# Patient Record
Sex: FEMALE | Race: ASIAN | NOT HISPANIC OR LATINO | URBAN - METROPOLITAN AREA
[De-identification: names, ages, dates, MRNs, and addresses within clinical notes are randomized per-mention and may not be internally consistent; named-entity substitution may affect disease eponyms.]

---

## 2020-06-24 ENCOUNTER — OUTPATIENT (OUTPATIENT)
Dept: OUTPATIENT SERVICES | Facility: HOSPITAL | Age: 70
LOS: 1 days | Discharge: HOME | End: 2020-06-24

## 2020-06-24 VITALS
RESPIRATION RATE: 17 BRPM | HEART RATE: 72 BPM | WEIGHT: 134.92 LBS | HEIGHT: 62 IN | DIASTOLIC BLOOD PRESSURE: 97 MMHG | TEMPERATURE: 98 F | SYSTOLIC BLOOD PRESSURE: 164 MMHG

## 2020-06-24 VITALS — HEART RATE: 75 BPM | DIASTOLIC BLOOD PRESSURE: 65 MMHG | SYSTOLIC BLOOD PRESSURE: 152 MMHG

## 2020-06-24 DIAGNOSIS — Z98.890 OTHER SPECIFIED POSTPROCEDURAL STATES: Chronic | ICD-10-CM

## 2020-07-01 DIAGNOSIS — H25.89 OTHER AGE-RELATED CATARACT: ICD-10-CM

## 2020-07-01 DIAGNOSIS — Z88.0 ALLERGY STATUS TO PENICILLIN: ICD-10-CM

## 2020-07-01 DIAGNOSIS — E78.00 PURE HYPERCHOLESTEROLEMIA, UNSPECIFIED: ICD-10-CM

## 2020-07-01 DIAGNOSIS — H52.201 UNSPECIFIED ASTIGMATISM, RIGHT EYE: ICD-10-CM

## 2020-07-28 PROBLEM — E78.00 PURE HYPERCHOLESTEROLEMIA, UNSPECIFIED: Chronic | Status: ACTIVE | Noted: 2020-06-24

## 2020-08-09 ENCOUNTER — OUTPATIENT (OUTPATIENT)
Dept: OUTPATIENT SERVICES | Facility: HOSPITAL | Age: 70
LOS: 1 days | Discharge: HOME | End: 2020-08-09

## 2020-08-09 DIAGNOSIS — Z11.59 ENCOUNTER FOR SCREENING FOR OTHER VIRAL DISEASES: ICD-10-CM

## 2020-08-09 DIAGNOSIS — Z98.890 OTHER SPECIFIED POSTPROCEDURAL STATES: Chronic | ICD-10-CM

## 2020-08-12 ENCOUNTER — OUTPATIENT (OUTPATIENT)
Dept: OUTPATIENT SERVICES | Facility: HOSPITAL | Age: 70
LOS: 1 days | Discharge: HOME | End: 2020-08-12

## 2020-08-12 VITALS — RESPIRATION RATE: 17 BRPM | DIASTOLIC BLOOD PRESSURE: 70 MMHG | HEART RATE: 77 BPM | SYSTOLIC BLOOD PRESSURE: 150 MMHG

## 2020-08-12 VITALS
TEMPERATURE: 97 F | RESPIRATION RATE: 18 BRPM | HEIGHT: 62 IN | WEIGHT: 134.92 LBS | OXYGEN SATURATION: 100 % | HEART RATE: 85 BPM | DIASTOLIC BLOOD PRESSURE: 83 MMHG | SYSTOLIC BLOOD PRESSURE: 171 MMHG

## 2020-08-12 DIAGNOSIS — Z88.1 ALLERGY STATUS TO OTHER ANTIBIOTIC AGENTS STATUS: ICD-10-CM

## 2020-08-12 DIAGNOSIS — Z98.890 OTHER SPECIFIED POSTPROCEDURAL STATES: Chronic | ICD-10-CM

## 2020-08-12 NOTE — PRE-ANESTHESIA EVALUATION ADULT - HEART RATE (BEATS/MIN)
85 Banner Transposition Flap Text: The defect edges were debeveled with a #15 scalpel blade.  Given the location of the defect and the proximity to free margins a Banner transposition flap was deemed most appropriate.  Using a sterile surgical marker, an appropriate flap drawn around the defect. The area thus outlined was incised deep to adipose tissue with a #15 scalpel blade.  The skin margins were undermined to an appropriate distance in all directions utilizing iris scissors.

## 2020-08-17 DIAGNOSIS — E78.00 PURE HYPERCHOLESTEROLEMIA, UNSPECIFIED: ICD-10-CM

## 2020-08-17 DIAGNOSIS — H26.9 UNSPECIFIED CATARACT: ICD-10-CM

## 2020-08-17 DIAGNOSIS — Z96.1 PRESENCE OF INTRAOCULAR LENS: ICD-10-CM

## 2020-08-17 DIAGNOSIS — H52.202 UNSPECIFIED ASTIGMATISM, LEFT EYE: ICD-10-CM

## 2020-08-17 DIAGNOSIS — Z98.41 CATARACT EXTRACTION STATUS, RIGHT EYE: ICD-10-CM

## 2020-08-17 DIAGNOSIS — Z88.1 ALLERGY STATUS TO OTHER ANTIBIOTIC AGENTS STATUS: ICD-10-CM

## 2020-08-17 DIAGNOSIS — Z20.828 CONTACT WITH AND (SUSPECTED) EXPOSURE TO OTHER VIRAL COMMUNICABLE DISEASES: ICD-10-CM

## 2021-01-06 PROBLEM — H26.9 UNSPECIFIED CATARACT: Chronic | Status: ACTIVE | Noted: 2020-08-12

## 2021-02-12 ENCOUNTER — APPOINTMENT (OUTPATIENT)
Dept: UROLOGY | Facility: CLINIC | Age: 71
End: 2021-02-12
Payer: COMMERCIAL

## 2021-02-12 VITALS
BODY MASS INDEX: 26.14 KG/M2 | WEIGHT: 142.06 LBS | HEART RATE: 100 BPM | DIASTOLIC BLOOD PRESSURE: 88 MMHG | HEIGHT: 62 IN | SYSTOLIC BLOOD PRESSURE: 189 MMHG

## 2021-02-12 DIAGNOSIS — Z78.9 OTHER SPECIFIED HEALTH STATUS: ICD-10-CM

## 2021-02-12 PROCEDURE — 99072 ADDL SUPL MATRL&STAF TM PHE: CPT

## 2021-02-12 PROCEDURE — 99204 OFFICE O/P NEW MOD 45 MIN: CPT

## 2021-02-16 LAB — URINE CYTOLOGY: NORMAL

## 2021-04-02 ENCOUNTER — APPOINTMENT (OUTPATIENT)
Dept: UROLOGY | Facility: CLINIC | Age: 71
End: 2021-04-02
Payer: COMMERCIAL

## 2021-04-02 VITALS — WEIGHT: 142 LBS | TEMPERATURE: 98 F | BODY MASS INDEX: 26.13 KG/M2 | HEIGHT: 62 IN

## 2021-04-02 DIAGNOSIS — R82.71 BACTERIURIA: ICD-10-CM

## 2021-04-02 DIAGNOSIS — N30.01 ACUTE CYSTITIS WITH HEMATURIA: ICD-10-CM

## 2021-04-02 PROCEDURE — 99214 OFFICE O/P EST MOD 30 MIN: CPT

## 2021-04-02 PROCEDURE — 99072 ADDL SUPL MATRL&STAF TM PHE: CPT

## 2021-04-02 NOTE — ASSESSMENT
[FreeTextEntry1] : 1. Gross hematuria  -- maybe related to bacteriuria.\par 2. ? Cystitis  --  Treated with ABT\par 3. Microhematuria \par 4. Uterine mass and Rt ovarian mass \par \par Plan\par GYN consult \par Repeat UCx\par Advised Cystoscopy, Pt and her  agreed under local Anesthesia.\par \par informed consent obtained, risks and benefits discussed including but not limited to infection, bleeding, sepsis, bladder perforation, post op retention, unforeseen complications such as, MI, CVA, DVT, PE\par \par

## 2021-04-02 NOTE — END OF VISIT
[FreeTextEntry3] : Patient notes was transcribed by kelsy Orosco     under the supervision of Dr. Guzman.\par I, Dr. Guzman, has reviewed the patient's chart and agree that it all lines with my medical decisions.\par

## 2021-04-02 NOTE — LETTER BODY
[Dear  ___] : Dear  [unfilled], [Consult Letter:] : I had the pleasure of evaluating your patient, [unfilled]. [( Thank you for referring [unfilled] for consultation for _____ )] : Thank you for referring [unfilled] for consultation for [unfilled] [Consult Closing:] : Thank you very much for allowing me to participate in the care of this patient.  If you have any questions, please do not hesitate to contact me. [FreeTextEntry1] : This is to summarize the consultation for Alina Duong seen February 12, 2021.\par \par Impression=  gross hematuria, of yet unknown etiology. History and physical exam seemed to suggest asymptomatic bacteriuria for which antibiotic therapy was started and completed. Patient denies voiding symptoms during the episode of hematuria. Urologic workup is clinically indicated considering the possibility for nephrolithiasis and bladder neoplasia.\par \par Plan=  urine cytology . CT urogram . Repeat urine culture . Consideration for cystoscopy

## 2021-04-02 NOTE — HISTORY OF PRESENT ILLNESS
[Hematuria - Gross] : gross hematuria [None] : None [FreeTextEntry1] : 71 year old female presents for f/up visit, sent by pcp for evaluation of episode of gross hematuria on Dec. 31. \par Initially when the Patient first presented at the office she reported waking up with spontaneous gross hematuria but with no dysuria or fever. PCP performed a UCx at that time and Rx'ed ABx Nitrofurantoin Mono-MCR taken for 6 days. Symptoms improved.\par \par I explained to the pt that she has to f/u GYN for the possible fibroid.\par \par CT scan from 2/21 : no evidence of upper and lower tract disease. 4.6 x4.5 cm low density lesion with the uterine body with no clear visualization of the endometrium. while this could represent a fibroid, it is incompletely evaluated. \par Urine Cytology 2/21:  Negative for Malignant Cells. \par 1/21 (from PCP) UCx < 10,000 colonies.\par 2/21 UA negative. [Urinary Urgency] : no urinary urgency [Urinary Frequency] : no urinary frequency [Straining] : no straining [Weak Stream] : no weak stream [Dysuria] : no dysuria [Abdominal Pain] : no abdominal pain [Flank Pain] : no flank pain [Fever] : no fever [Nausea] : no nausea [Anorexia] : no anorexia

## 2021-04-02 NOTE — PHYSICAL EXAM
[General Appearance - Well Developed] : well developed [General Appearance - Well Nourished] : well nourished [Normal Appearance] : normal appearance [Well Groomed] : well groomed [General Appearance - In No Acute Distress] : no acute distress [Abdomen Soft] : soft [Abdomen Tenderness] : non-tender [Costovertebral Angle Tenderness] : no ~M costovertebral angle tenderness [Skin Color & Pigmentation] : normal skin color and pigmentation [Edema] : no peripheral edema [] : no respiratory distress [Respiration, Rhythm And Depth] : normal respiratory rhythm and effort [Exaggerated Use Of Accessory Muscles For Inspiration] : no accessory muscle use [Oriented To Time, Place, And Person] : oriented to person, place, and time [Affect] : the affect was normal [Mood] : the mood was normal [Not Anxious] : not anxious [Normal Station and Gait] : the gait and station were normal for the patient's age [No Focal Deficits] : no focal deficits [No Palpable Adenopathy] : no palpable adenopathy [FreeTextEntry1] : Deferred to gyn.

## 2021-04-02 NOTE — REASON FOR VISIT
[Spouse] : spouse [Initial Visit ___] : [unfilled] is here today for an initial visit  for [unfilled] [Follow-up Visit ___] : a follow-up visit  for [unfilled]

## 2021-04-07 DIAGNOSIS — N39.0 URINARY TRACT INFECTION, SITE NOT SPECIFIED: ICD-10-CM

## 2021-05-13 ENCOUNTER — APPOINTMENT (OUTPATIENT)
Dept: UROLOGY | Facility: CLINIC | Age: 71
End: 2021-05-13
Payer: MEDICARE

## 2021-05-13 VITALS — TEMPERATURE: 97.7 F | HEIGHT: 62 IN | WEIGHT: 137 LBS | BODY MASS INDEX: 25.21 KG/M2

## 2021-05-13 DIAGNOSIS — R31.29 OTHER MICROSCOPIC HEMATURIA: ICD-10-CM

## 2021-05-13 DIAGNOSIS — R31.0 GROSS HEMATURIA: ICD-10-CM

## 2021-05-13 LAB
BILIRUB UR QL STRIP: NORMAL
CLARITY UR: CLEAR
COLLECTION METHOD: NORMAL
GLUCOSE UR-MCNC: NORMAL
HCG UR QL: 0.2 EU/DL
HGB UR QL STRIP.AUTO: NORMAL
KETONES UR-MCNC: NORMAL
LEUKOCYTE ESTERASE UR QL STRIP: NORMAL
NITRITE UR QL STRIP: NORMAL
PH UR STRIP: 5
PROT UR STRIP-MCNC: NORMAL
SP GR UR STRIP: 1

## 2021-05-13 PROCEDURE — 99072 ADDL SUPL MATRL&STAF TM PHE: CPT

## 2021-05-13 PROCEDURE — 81002 URINALYSIS NONAUTO W/O SCOPE: CPT

## 2021-05-13 PROCEDURE — 52000 CYSTOURETHROSCOPY: CPT

## 2021-06-03 ENCOUNTER — APPOINTMENT (OUTPATIENT)
Dept: OBGYN | Facility: CLINIC | Age: 71
End: 2021-06-03

## 2022-01-25 NOTE — ASU PREOP CHECKLIST - AICD PRESENT
Multiple chronic diagnoses but at a stable baseline  Continue medications      Follow-up with specialist   Revisit with me in November no

## 2022-03-02 ENCOUNTER — EMERGENCY (EMERGENCY)
Facility: HOSPITAL | Age: 72
LOS: 0 days | Discharge: HOME | End: 2022-03-02
Attending: EMERGENCY MEDICINE | Admitting: EMERGENCY MEDICINE
Payer: MEDICARE

## 2022-03-02 ENCOUNTER — TRANSCRIPTION ENCOUNTER (OUTPATIENT)
Age: 72
End: 2022-03-02

## 2022-03-02 VITALS
SYSTOLIC BLOOD PRESSURE: 160 MMHG | RESPIRATION RATE: 18 BRPM | HEIGHT: 62 IN | DIASTOLIC BLOOD PRESSURE: 96 MMHG | OXYGEN SATURATION: 97 % | WEIGHT: 134.92 LBS | TEMPERATURE: 97 F | HEART RATE: 96 BPM

## 2022-03-02 DIAGNOSIS — R00.2 PALPITATIONS: ICD-10-CM

## 2022-03-02 DIAGNOSIS — E78.00 PURE HYPERCHOLESTEROLEMIA, UNSPECIFIED: ICD-10-CM

## 2022-03-02 DIAGNOSIS — E83.52 HYPERCALCEMIA: ICD-10-CM

## 2022-03-02 DIAGNOSIS — Z98.890 OTHER SPECIFIED POSTPROCEDURAL STATES: Chronic | ICD-10-CM

## 2022-03-02 DIAGNOSIS — Z88.0 ALLERGY STATUS TO PENICILLIN: ICD-10-CM

## 2022-03-02 DIAGNOSIS — R07.9 CHEST PAIN, UNSPECIFIED: ICD-10-CM

## 2022-03-02 DIAGNOSIS — Z91.048 OTHER NONMEDICINAL SUBSTANCE ALLERGY STATUS: ICD-10-CM

## 2022-03-02 LAB
ALBUMIN SERPL ELPH-MCNC: 4.7 G/DL — SIGNIFICANT CHANGE UP (ref 3.5–5.2)
ALP SERPL-CCNC: 59 U/L — SIGNIFICANT CHANGE UP (ref 30–115)
ALT FLD-CCNC: 10 U/L — SIGNIFICANT CHANGE UP (ref 0–41)
ANION GAP SERPL CALC-SCNC: 11 MMOL/L — SIGNIFICANT CHANGE UP (ref 7–14)
AST SERPL-CCNC: 26 U/L — SIGNIFICANT CHANGE UP (ref 0–41)
BASOPHILS # BLD AUTO: 0.02 K/UL — SIGNIFICANT CHANGE UP (ref 0–0.2)
BASOPHILS NFR BLD AUTO: 0.4 % — SIGNIFICANT CHANGE UP (ref 0–1)
BILIRUB SERPL-MCNC: 0.2 MG/DL — SIGNIFICANT CHANGE UP (ref 0.2–1.2)
BUN SERPL-MCNC: 10 MG/DL — SIGNIFICANT CHANGE UP (ref 10–20)
CALCIUM SERPL-MCNC: 11.4 MG/DL — HIGH (ref 8.5–10.1)
CHLORIDE SERPL-SCNC: 103 MMOL/L — SIGNIFICANT CHANGE UP (ref 98–110)
CO2 SERPL-SCNC: 27 MMOL/L — SIGNIFICANT CHANGE UP (ref 17–32)
CREAT SERPL-MCNC: 0.6 MG/DL — LOW (ref 0.7–1.5)
EGFR: 96 ML/MIN/1.73M2 — SIGNIFICANT CHANGE UP
EOSINOPHIL # BLD AUTO: 0.03 K/UL — SIGNIFICANT CHANGE UP (ref 0–0.7)
EOSINOPHIL NFR BLD AUTO: 0.6 % — SIGNIFICANT CHANGE UP (ref 0–8)
GLUCOSE SERPL-MCNC: 110 MG/DL — HIGH (ref 70–99)
HCT VFR BLD CALC: 42.2 % — SIGNIFICANT CHANGE UP (ref 37–47)
HGB BLD-MCNC: 13.9 G/DL — SIGNIFICANT CHANGE UP (ref 12–16)
IMM GRANULOCYTES NFR BLD AUTO: 0.2 % — SIGNIFICANT CHANGE UP (ref 0.1–0.3)
LYMPHOCYTES # BLD AUTO: 1.23 K/UL — SIGNIFICANT CHANGE UP (ref 1.2–3.4)
LYMPHOCYTES # BLD AUTO: 25.4 % — SIGNIFICANT CHANGE UP (ref 20.5–51.1)
MAGNESIUM SERPL-MCNC: 2.4 MG/DL — SIGNIFICANT CHANGE UP (ref 1.8–2.4)
MCHC RBC-ENTMCNC: 30 PG — SIGNIFICANT CHANGE UP (ref 27–31)
MCHC RBC-ENTMCNC: 32.9 G/DL — SIGNIFICANT CHANGE UP (ref 32–37)
MCV RBC AUTO: 91.1 FL — SIGNIFICANT CHANGE UP (ref 81–99)
MONOCYTES # BLD AUTO: 0.33 K/UL — SIGNIFICANT CHANGE UP (ref 0.1–0.6)
MONOCYTES NFR BLD AUTO: 6.8 % — SIGNIFICANT CHANGE UP (ref 1.7–9.3)
NEUTROPHILS # BLD AUTO: 3.23 K/UL — SIGNIFICANT CHANGE UP (ref 1.4–6.5)
NEUTROPHILS NFR BLD AUTO: 66.6 % — SIGNIFICANT CHANGE UP (ref 42.2–75.2)
NRBC # BLD: 0 /100 WBCS — SIGNIFICANT CHANGE UP (ref 0–0)
PLATELET # BLD AUTO: 231 K/UL — SIGNIFICANT CHANGE UP (ref 130–400)
POTASSIUM SERPL-MCNC: 4.2 MMOL/L — SIGNIFICANT CHANGE UP (ref 3.5–5)
POTASSIUM SERPL-SCNC: 4.2 MMOL/L — SIGNIFICANT CHANGE UP (ref 3.5–5)
PROT SERPL-MCNC: 7.4 G/DL — SIGNIFICANT CHANGE UP (ref 6–8)
RBC # BLD: 4.63 M/UL — SIGNIFICANT CHANGE UP (ref 4.2–5.4)
RBC # FLD: 12.6 % — SIGNIFICANT CHANGE UP (ref 11.5–14.5)
SODIUM SERPL-SCNC: 141 MMOL/L — SIGNIFICANT CHANGE UP (ref 135–146)
TROPONIN T SERPL-MCNC: <0.01 NG/ML — SIGNIFICANT CHANGE UP
WBC # BLD: 4.85 K/UL — SIGNIFICANT CHANGE UP (ref 4.8–10.8)
WBC # FLD AUTO: 4.85 K/UL — SIGNIFICANT CHANGE UP (ref 4.8–10.8)

## 2022-03-02 PROCEDURE — 71045 X-RAY EXAM CHEST 1 VIEW: CPT | Mod: 26

## 2022-03-02 PROCEDURE — 93010 ELECTROCARDIOGRAM REPORT: CPT

## 2022-03-02 PROCEDURE — 99285 EMERGENCY DEPT VISIT HI MDM: CPT

## 2022-03-02 NOTE — ED PROVIDER NOTE - PROGRESS NOTE DETAILS
all results discussed with patient. patient is aware of ca . patient admits that she is on multi vitamin and then takes another ca tablet.   patient will follow up with her pmd this week.

## 2022-03-02 NOTE — ED PROVIDER NOTE - ATTENDING CONTRIBUTION TO CARE
71-year-old female presents with family member for evaluation of palpitations that started this morning while she was in the shower.  Episode lasted for about 15 minutes.  Patient states she had similar episode back in January and did not seek medical care at that time.  No chest pain, no shortness of breath, no dizziness, no nausea or vomiting. On exam pt in NAD AAO x 3, appears well, pleasant, speech is clear and fluent, face symmetrical, Lungs cta b/l, no wrr, no mur, no edema, abd soft nt nd,

## 2022-03-02 NOTE — ED PROVIDER NOTE - NSICDXPASTSURGICALHX_GEN_ALL_CORE_FT
PAST SURGICAL HISTORY:  History of surgery right ankle orif with screws, left breast cyst, ovary removed

## 2022-03-02 NOTE — ED PROVIDER NOTE - PATIENT PORTAL LINK FT
You can access the FollowMyHealth Patient Portal offered by [facility] by registering at the following website: [email or web address]. By joining [employer]’s FollowMyHealth portal, you will also be able to view your health information using other applications (apps) compatible with our system.

## 2022-03-02 NOTE — ED PROVIDER NOTE - NS ED ROS FT
Review of Systems:  	•	CONSTITUTIONAL - no fever, no diaphoresis, no chills  	•	SKIN - no rash  	•	HEMATOLOGIC - no bleeding, no bruising  	•	EYES - no eye pain, no blurry vision  	•	ENT - no change in hearing, no sore throat, no ear pain or tinnitus  	•	RESPIRATORY - no shortness of breath, no cough  	•	CARDIAC - no chest pain, no palpitations  	•	GI - no abd pain, no nausea, no vomiting, no diarrhea, no constipation  	•	GENITO-URINARY - no discharge, no dysuria; no hematuria, no increased urinary frequency  	•	MUSCULOSKELETAL - no joint paint, no swelling, no redness  	•	NEUROLOGIC -  weakness resolved , no headache, no paresthesias, no LOC

## 2022-03-02 NOTE — ED PROVIDER NOTE - CLINICAL SUMMARY MEDICAL DECISION MAKING FREE TEXT BOX
Labs and imaging obtained. Pt monitored on cardiac monitor. no arrythmia or ectopy noted, Results reviewed and discussed with pt.  Pt has been taking calcium supplements in addition to her MVI.  Pt instructed to hold her calcium supplement and follow up with PMD for repeat levels and further testing. Pt instructed to return if any worsening symptoms or concerns.  They verbalize understanding.

## 2022-03-02 NOTE — ED ADULT TRIAGE NOTE - CHIEF COMPLAINT QUOTE
pt states at 9:30am she felt palpitations that have resolved associated with persistent fatigue. Denies chest pain

## 2022-03-02 NOTE — ED PROVIDER NOTE - CARE PROVIDER_API CALL
Bud Watts)  Cardiovascular Disease; Internal Medicine  [address] [place] [address]  Phone: [phone]  Fax: [phone]  Follow Up Time:     Your doctor,   Phone: (   )    -  Fax: (   )    -  Follow Up Time:

## 2022-03-02 NOTE — ED PROVIDER NOTE - OBJECTIVE STATEMENT
this is 72 yo female presents to ed for evaluation of palpitations. patient states that she was in shower and started to feel symptoms. patient states symptoms lasted about 15 min. patient denies any chest pain. this is 72 yo female presents to ed for evaluation of palpitations. patient states that she was in shower and started to feel symptoms. patient states symptoms lasted about 15 min. patient denies any chest pain. patient states symptoms are resolved

## 2022-03-25 ENCOUNTER — NON-APPOINTMENT (OUTPATIENT)
Age: 72
End: 2022-03-25

## 2022-03-30 ENCOUNTER — APPOINTMENT (OUTPATIENT)
Dept: CARDIOLOGY | Facility: CLINIC | Age: 72
End: 2022-03-30
Payer: MEDICARE

## 2022-03-30 VITALS
WEIGHT: 139 LBS | SYSTOLIC BLOOD PRESSURE: 142 MMHG | DIASTOLIC BLOOD PRESSURE: 76 MMHG | BODY MASS INDEX: 25.58 KG/M2 | HEIGHT: 62 IN

## 2022-03-30 DIAGNOSIS — Z83.79 FAMILY HISTORY OF OTHER DISEASES OF THE DIGESTIVE SYSTEM: ICD-10-CM

## 2022-03-30 DIAGNOSIS — N30.31 TRIGONITIS WITH HEMATURIA: ICD-10-CM

## 2022-03-30 PROCEDURE — 93000 ELECTROCARDIOGRAM COMPLETE: CPT

## 2022-03-30 PROCEDURE — 99204 OFFICE O/P NEW MOD 45 MIN: CPT

## 2022-03-30 RX ORDER — MULTIVITAMIN
TABLET ORAL
Refills: 0 | Status: DISCONTINUED | COMMUNITY
End: 2022-03-30

## 2022-03-30 RX ORDER — SULFAMETHOXAZOLE AND TRIMETHOPRIM 800; 160 MG/1; MG/1
800-160 TABLET ORAL
Qty: 14 | Refills: 0 | Status: DISCONTINUED | COMMUNITY
Start: 2021-04-07 | End: 2022-03-30

## 2022-03-30 RX ORDER — NITROFURANTOIN (MONOHYDRATE/MACROCRYSTALS) 25; 75 MG/1; MG/1
100 CAPSULE ORAL
Qty: 4 | Refills: 3 | Status: DISCONTINUED | COMMUNITY
Start: 2021-05-13 | End: 2022-03-30

## 2022-03-30 NOTE — REVIEW OF SYSTEMS
[Fever] : no fever [Chills] : no chills [Blurry Vision] : no blurred vision [Hearing Loss] : hearing loss [SOB] : no shortness of breath [Dyspnea on exertion] : dyspnea during exertion [Cough] : no cough [Abdominal Pain] : no abdominal pain [Dysuria] : no dysuria [Joint Pain] : no joint pain [Dizziness] : no dizziness [Confusion] : no confusion was observed [Easy Bleeding] : no tendency for easy bleeding [de-identified] : edmund

## 2022-03-30 NOTE — HISTORY OF PRESENT ILLNESS
[FreeTextEntry1] : 71 yo with this month several episodes palpitations up to 10 minutes . No associated chest pain. She felt light headed. She has SANCHEZ. She drinks decaf coffee. She does stretching. She snores. She dreams.

## 2022-03-30 NOTE — PHYSICAL EXAM
[Well Developed] : well developed [Well Nourished] : well nourished [Normal Conjunctiva] : normal conjunctiva [Normal Venous Pressure] : normal venous pressure [5th Left ICS - MCL] : palpated at the 5th LICS in the midclavicular line [Normal Rate] : normal [Rhythm Regular] : regular [Normal S1] : normal S1 [Normal S2] : normal S2 [S3] : no S3 [S4] : no S4 [I] : a grade 1 [Clear Lung Fields] : clear lung fields [Soft] : abdomen soft [Normal Gait] : normal gait [No Edema] : no edema [No Rash] : no rash [Moves all extremities] : moves all extremities [Alert and Oriented] : alert and oriented

## 2022-03-30 NOTE — DISCUSSION/SUMMARY
[FreeTextEntry1] : 71 yo with this month several episodes palpitations up to 10 minutes . No associated chest pain. She felt light headed. She has SANCHEZ. She drinks decaf coffee. She does stretching. She snores. She dreams. \par Her EKG is abnormal . Possible septal MI. She has SANCHEZ . She needs stress echo.  She also needs a MCOT. All explained to patient and  . Questions answered

## 2022-04-05 ENCOUNTER — OUTPATIENT (OUTPATIENT)
Dept: OUTPATIENT SERVICES | Facility: HOSPITAL | Age: 72
LOS: 1 days | Discharge: HOME | End: 2022-04-05
Payer: MEDICARE

## 2022-04-05 DIAGNOSIS — R00.2 PALPITATIONS: ICD-10-CM

## 2022-04-05 DIAGNOSIS — Z98.890 OTHER SPECIFIED POSTPROCEDURAL STATES: Chronic | ICD-10-CM

## 2022-04-05 PROCEDURE — 93018 CV STRESS TEST I&R ONLY: CPT

## 2022-04-05 PROCEDURE — 93351 STRESS TTE COMPLETE: CPT | Mod: 26

## 2022-04-05 PROCEDURE — 93016 CV STRESS TEST SUPVJ ONLY: CPT

## 2022-04-29 ENCOUNTER — APPOINTMENT (OUTPATIENT)
Dept: CARDIOLOGY | Facility: CLINIC | Age: 72
End: 2022-04-29

## 2022-04-29 PROCEDURE — 93228 REMOTE 30 DAY ECG REV/REPORT: CPT

## 2022-06-06 ENCOUNTER — APPOINTMENT (OUTPATIENT)
Dept: CARDIOLOGY | Facility: CLINIC | Age: 72
End: 2022-06-06
Payer: MEDICARE

## 2022-06-06 VITALS
BODY MASS INDEX: 24.66 KG/M2 | HEIGHT: 62 IN | WEIGHT: 134 LBS | SYSTOLIC BLOOD PRESSURE: 136 MMHG | DIASTOLIC BLOOD PRESSURE: 70 MMHG

## 2022-06-06 PROCEDURE — 99213 OFFICE O/P EST LOW 20 MIN: CPT

## 2022-06-06 NOTE — DISCUSSION/SUMMARY
[FreeTextEntry1] : 73 yo with this month several episodes palpitations up to 10 minutes . No associated chest pain. She felt light headed. She has ARAMBULA. She drinks decaf coffee. She does stretching. She snores. She dreams. \par  Palpitations now  resolved MCOT 4/22 neg. SE 4/22 EKG equivocal echo neg. She lost 5 llbs. Now treadmill 20 mins 6 per week.No chest pain. No Arambula.. told exrcise 30 minutes.  questions answered

## 2022-06-06 NOTE — HISTORY OF PRESENT ILLNESS
[FreeTextEntry1] : 73 yo with this month several episodes palpitations up to 10 minutes . Occurred 3/22. Now resolved MCOT 4/22 neg SE 4/22 EKG equivocal echo neg. She lost 5 llbs. Now treadmill 20 mins 6 per week.No chest pain. No Arambula.

## 2022-06-06 NOTE — REVIEW OF SYSTEMS
[Fever] : no fever [Chills] : no chills [Blurry Vision] : no blurred vision [Hearing Loss] : hearing loss [SOB] : no shortness of breath [Dyspnea on exertion] : dyspnea during exertion [Cough] : no cough [Abdominal Pain] : no abdominal pain [Dysuria] : no dysuria [Joint Pain] : no joint pain [Dizziness] : no dizziness [Confusion] : no confusion was observed [Easy Bleeding] : no tendency for easy bleeding [de-identified] : edmund

## 2022-12-05 ENCOUNTER — APPOINTMENT (OUTPATIENT)
Dept: CARDIOLOGY | Facility: CLINIC | Age: 72
End: 2022-12-05

## 2022-12-05 VITALS
SYSTOLIC BLOOD PRESSURE: 126 MMHG | WEIGHT: 130 LBS | BODY MASS INDEX: 23.92 KG/M2 | HEIGHT: 62 IN | DIASTOLIC BLOOD PRESSURE: 72 MMHG

## 2022-12-05 PROCEDURE — 99213 OFFICE O/P EST LOW 20 MIN: CPT

## 2022-12-05 NOTE — DISCUSSION/SUMMARY
[FreeTextEntry1] : 71 yo with this month several episodes palpitations up to 10 minutes in past . No associated chest pain. She felt light headed. No further ARAMBULA.  She drinks decaf coffee. She does stretching. She snores. She dreams. \par  Palpitations now  resolved MCOT 4/22 neg. SE 4/22 EKG equivocal echo neg. She lost 4  llbs. Now treadmill 30 mins 5  per week.No chest pain. No Arambula.

## 2022-12-05 NOTE — HISTORY OF PRESENT ILLNESS
[FreeTextEntry1] : 73 yo Palpitations in past. Felt was going have palpitations but did not.   Now resolved MCOT 4/22 neg SE 4/22 EKG equivocal echo neg. She lost 4 llbs. Now treadmill 30 mins 5 per week.No chest pain. No Arambula.

## 2022-12-05 NOTE — REVIEW OF SYSTEMS
[Fever] : no fever [Chills] : no chills [Blurry Vision] : no blurred vision [Hearing Loss] : hearing loss [SOB] : no shortness of breath [Dyspnea on exertion] : dyspnea during exertion [Cough] : no cough [Abdominal Pain] : no abdominal pain [Dysuria] : no dysuria [Joint Pain] : no joint pain [Dizziness] : no dizziness [Confusion] : no confusion was observed [Easy Bleeding] : no tendency for easy bleeding [de-identified] : edmund

## 2023-07-24 ENCOUNTER — APPOINTMENT (OUTPATIENT)
Dept: PAIN MANAGEMENT | Facility: CLINIC | Age: 73
End: 2023-07-24
Payer: MEDICARE

## 2023-07-24 DIAGNOSIS — M54.12 RADICULOPATHY, CERVICAL REGION: ICD-10-CM

## 2023-07-24 PROCEDURE — 99204 OFFICE O/P NEW MOD 45 MIN: CPT

## 2023-07-24 RX ORDER — MELOXICAM 7.5 MG/1
7.5 TABLET ORAL
Qty: 30 | Refills: 0 | Status: ACTIVE | COMMUNITY
Start: 2023-07-24 | End: 1900-01-01

## 2023-07-26 NOTE — DISCUSSION/SUMMARY
[de-identified] : Recommendations\par 1. Physical therapy\par \par 2. meloxicam 15mg qD PRN\par I advised MULU that the NSAID should be taken with food.  In addition while taking the prescribed NSAID, no over the counter or other NSAIDs should be used, such as ibuprofen (Motrin or Advil) or naproxen (Aleve) as this can cause stomach upset or other side effects.  If needed for fever or breakthrough pain Tylenol can be used. \par \par f/u in 6 weeks

## 2023-07-26 NOTE — HISTORY OF PRESENT ILLNESS
[FreeTextEntry1] : Patient presents with complaints of neck pain that refers to the shoulders and upper back. The patient presents with complaints of neck pain. Pain is located in the midline and refers down the R upper extremities. The pain is aching and throbbing with intermittent sharp stabbing sensations down the r arm. The pain is present the majority of the day and made worse with activity.  The pain limits the patient's overall functional status and quality of life. The pain is associated with subjective weakness and occasional numbness/tingling in the upper extremity and is not responding to activity modifications, rest, or nsaid use. Pain is 8/10 on the pain scale.

## 2023-07-26 NOTE — PHYSICAL EXAM
[de-identified] : Cervical Spine Exam:\par \par Inspection:\par erythema (-) \par ecchymosis (-) \par rashes (-) \par \par Palpation:                                        \par Cervical paraspinal mm tenderness:   R (+); L(-)\par Upper trapezius mm tenderness:        R (+); L (-)\par Rhomboids tenderness:                       R (-); L (-)\par Scalenes mm tenderness:                   R (-); L (-)\par Occipital Ridge:                                    R (-); L (-)\par Supraspinatus mm tenderness:           R (-); L (-)\par \par ROM:  \par limited rom 2/2 to pain with neck flexion and neck extension\par \par Strength Testing:            Right    Left\par Deltoid                             (5/5)    (5/5)\par Biceps:                            (5/5)    (5/5)\par Triceps:                           (5/5)    (5/5)\par Finger Abductors:           (5/5)    (5/5)\par Grasp:                             (5/5)    (5/5)\par \par Special Testing:\par Spurling Test:                  R (+); L (-)\par Facet load test:               R (-); L (-)

## 2023-09-05 ENCOUNTER — APPOINTMENT (OUTPATIENT)
Dept: PAIN MANAGEMENT | Facility: CLINIC | Age: 73
End: 2023-09-05
Payer: MEDICARE

## 2023-09-05 VITALS — WEIGHT: 130 LBS | BODY MASS INDEX: 23.92 KG/M2 | HEIGHT: 62 IN

## 2023-09-05 PROCEDURE — 99214 OFFICE O/P EST MOD 30 MIN: CPT

## 2023-09-05 NOTE — PHYSICAL EXAM
[de-identified] : Cervical Spine Exam:  Inspection: erythema (-)  ecchymosis (-)  rashes (-)   Palpation:                                         Cervical paraspinal mm tenderness:   R (+); L(-) Upper trapezius mm tenderness:        R (+); L (-) Rhomboids tenderness:                       R (-); L (-) Scalenes mm tenderness:                   R (-); L (-) Occipital Ridge:                                    R (-); L (-) Supraspinatus mm tenderness:           R (-); L (-)  ROM:   limited rom 2/2 to pain with neck flexion   Strength Testing:            Right    Left Deltoid                             (4+/5)    (5/5) Biceps:                            (5/5)    (5/5) Triceps:                           (5/5)    (5/5) Finger Abductors:           (5/5)    (5/5) Grasp:                             (5/5)    (5/5)  Special Testing: Spurling Test:                  R (+); L (-) Facet load test:               R (-); L (-)

## 2023-09-05 NOTE — DISCUSSION/SUMMARY
[de-identified] : Recommendations 1. continue with home exercises for the cervical spine   2. c/w meloxicam 15mg qD PRN I advised Duong that the NSAID should be taken with food.  In addition while taking the prescribed NSAID, no over the counter or other NSAIDs should be used, such as ibuprofen (Motrin or Advil) or naproxen (Aleve) as this can cause stomach upset or other side effects.  If needed for fever or breakthrough pain Tylenol can be used.   3. Will proceed with C7-T1 ATIYA with mac  Treatment options were discussed. The patient has been having persistent, severe neck pain and cervical radicular pain that has minimally improved with conservative therapy thus far (including physical therapy/chiropractic manipulations/home stretching and anti-inflammatory medications for 8 weeks. The patient was given the option of proceeding with a cervical epidural steroid injection to try to get the patient some pain relief.   The risks and benefits were discussed, which included the associated problems with steroids, bleeding, nerve injury, lack of improvement with pain, and 0.5% chance of a post dural puncture headache.   follow up two weeks after injection   I Paula Landry attest that this documentation has been prepared under the direction and in the presence of provider Dr. Jeff Wooten.  The documentation recorded by the scribe in my presence, accurately reflects the service I personally performed, and the decisions made by me with my edits as appropriate.   Jeff Wooten, DO

## 2023-09-05 NOTE — HISTORY OF PRESENT ILLNESS
[FreeTextEntry1] : Patient presents with complaints of neck pain that refers to the shoulders and upper back. The patient presents with complaints of neck pain. Pain is located in the midline and refers down the R upper extremities. The pain is aching and throbbing with intermittent sharp stabbing sensations down the r arm. The pain is present the majority of the day and made worse with activity.  The pain limits the patient's overall functional status and quality of life. The pain is associated with subjective weakness and occasional numbness/tingling in the upper extremity and is not responding to activity modifications, rest, or nsaid use. Pain is 8/10 on the pain scale.   9/5/23: Today this patient is here for a follow up visit for her right sided neck pain. The patient reports of 50% of improvement since we seen her last. This patent has utilized meloxicam for ten days with relief. The patient completed sessions of PT for the cervical spine and states her last session was last week. The patient pain has been going on for 3 months that is worse upon waking up with pain that is associated with stiffness. When turning head that patient feels a pinch like sensation down right side of cervical trap. This pain is disrupting her sleep at night with certain movements. The pain level today is 5/10 on the pain scale.   Patient denies any bowel or bladder dysfunction, incontinence, or saddle anesthesia.

## 2023-09-05 NOTE — DATA REVIEWED
[FreeTextEntry1] : reviewed in detail MRI of the cervical spine that was done on 7/5/23: broad-based central disc protrusions and moderate BL neural foraminal narrowing at C2-3 and C4-5 with mild flattening of the ventral cord at C4-5, unchanged. Broad-based disc/osteophyte complexes at the C3-4, C5-6 and C6-7 levels with mild flattening of the ventral cord, unchanged. Moderate BL neural foraminal narrowing is also reidentified at C3-4 and C5-6 with marked BL neural foraminal narrowing at C6-7.  Left paracentral/subarticular disc protrusion now noted at C7-T1 which mildly flattens the ventral cord on the left.  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

## 2023-09-20 ENCOUNTER — APPOINTMENT (OUTPATIENT)
Dept: PAIN MANAGEMENT | Facility: CLINIC | Age: 73
End: 2023-09-20
Payer: MEDICARE

## 2023-09-20 PROCEDURE — 93040 RHYTHM ECG WITH REPORT: CPT | Mod: 59

## 2023-09-20 PROCEDURE — 00630 ANES PX LUMBAR REGION NOS: CPT | Mod: QZ,P2

## 2023-09-20 PROCEDURE — 94761 N-INVAS EAR/PLS OXIMETRY MLT: CPT

## 2023-09-20 PROCEDURE — 62321 NJX INTERLAMINAR CRV/THRC: CPT

## 2023-09-20 PROCEDURE — 93770 DETERMINATION VENOUS PRESS: CPT

## 2023-10-10 ENCOUNTER — APPOINTMENT (OUTPATIENT)
Dept: PAIN MANAGEMENT | Facility: CLINIC | Age: 73
End: 2023-10-10
Payer: MEDICARE

## 2023-10-10 VITALS — BODY MASS INDEX: 23.92 KG/M2 | WEIGHT: 130 LBS | HEIGHT: 62 IN

## 2023-10-10 PROCEDURE — 99214 OFFICE O/P EST MOD 30 MIN: CPT

## 2023-10-18 NOTE — PHYSICAL EXAM
Pt's wife called regarding the faxed forms for her 's diabetic shoes. I told her it's in Dr. Caden Vazquez paperwork for today. She said it needs to be filled out STAT. Please advise, thank you! [Well Developed] : well developed [Well Nourished] : well nourished [Normal Conjunctiva] : normal conjunctiva [Normal Venous Pressure] : normal venous pressure [5th Left ICS - MCL] : palpated at the 5th LICS in the midclavicular line [Normal Rate] : normal [Rhythm Regular] : regular [Normal S1] : normal S1 [Normal S2] : normal S2 [S3] : no S3 [S4] : no S4 [I] : a grade 1 [Clear Lung Fields] : clear lung fields [Soft] : abdomen soft [Normal Gait] : normal gait [No Edema] : no edema [No Rash] : no rash [Moves all extremities] : moves all extremities [Alert and Oriented] : alert and oriented

## 2023-12-11 ENCOUNTER — APPOINTMENT (OUTPATIENT)
Dept: CARDIOLOGY | Facility: CLINIC | Age: 73
End: 2023-12-11

## 2024-02-26 ENCOUNTER — NON-APPOINTMENT (OUTPATIENT)
Age: 74
End: 2024-02-26

## 2024-03-05 ENCOUNTER — APPOINTMENT (OUTPATIENT)
Dept: PAIN MANAGEMENT | Facility: CLINIC | Age: 74
End: 2024-03-05

## 2024-03-15 ENCOUNTER — APPOINTMENT (OUTPATIENT)
Dept: PAIN MANAGEMENT | Facility: CLINIC | Age: 74
End: 2024-03-15
Payer: MEDICARE

## 2024-03-15 DIAGNOSIS — M50.10 CERVICAL DISC DISORDER WITH RADICULOPATHY, UNSPECIFIED CERVICAL REGION: ICD-10-CM

## 2024-03-15 DIAGNOSIS — M54.12 RADICULOPATHY, CERVICAL REGION: ICD-10-CM

## 2024-03-15 PROCEDURE — 99214 OFFICE O/P EST MOD 30 MIN: CPT

## 2024-03-18 PROBLEM — M54.12 CERVICAL RADICULITIS: Status: ACTIVE | Noted: 2023-07-24

## 2024-03-18 PROBLEM — M50.10 HERNIATION OF CERVICAL INTERVERTEBRAL DISC WITH RADICULOPATHY: Status: ACTIVE | Noted: 2023-09-05

## 2024-03-18 NOTE — HISTORY OF PRESENT ILLNESS
[FreeTextEntry1] : Patient presents with complaints of neck pain that refers to the shoulders and upper back. The patient presents with complaints of neck pain. Pain is located in the midline and refers down the R upper extremities. The pain is aching and throbbing with intermittent sharp stabbing sensations down the r arm. The pain is present the majority of the day and made worse with activity.  The pain limits the patient's overall functional status and quality of life. The pain is associated with subjective weakness and occasional numbness/tingling in the upper extremity and is not responding to activity modifications, rest, or nsaid use. Pain is 8/10 on the pain scale.   10/10/23: Today this patient is status post C7- T1 ATIYA on 9/20/23 with 100% of pain relief and stiff having sustained relief and that her neck pain subsided however she still has residual tightness / stiffness. The patient states overall she is feeling much better since we first seen her. She denies any numbness and tingling in arms.  The patient has utilized meloxicam for ten days with good results.   TODAY, 3-: Revisit encounter.   Since last visit, she is presenting with tolerable neck pain. She is currently doing home exercises which does provide her with relief. She denies any numbness or tingling. She did undergo a C7-T1 cervical epidural steroid injection on 9-. She still reports 100% pain relief. She stopped taking Meloxicam as she had no pain.

## 2024-03-18 NOTE — PHYSICAL EXAM
[de-identified] : Cervical Spine Exam:  Inspection: erythema (-)  ecchymosis (-)  rashes (-)   Palpation:                                         Cervical paraspinal mm tenderness:   R (-); L(-) Upper trapezius mm tenderness:        R (-); L (-) Rhomboids tenderness:                       R (-); L (-) Scalenes mm tenderness:                   R (-); L (-) Occipital Ridge:                                    R (-); L (-) Supraspinatus mm tenderness:           R (-); L (-)  ROM:   limited rom 2/2 to pain  Strength Testing:            Right    Left Deltoid                             (5/5)    (5/5) Biceps:                            (5/5)    (5/5) Triceps:                           (5/5)    (5/5) Finger Abductors:           (5/5)    (5/5) Grasp:                             (5/5)    (5/5)  Special Testing: Spurling Test:                  R (-); L (-) Facet load test:               R (-); L (-)

## 2024-03-18 NOTE — DISCUSSION/SUMMARY
[de-identified] : A discussion regarding available pain management treatment options occurred with the patient. These included interventional, rehabilitative, pharmacological, and alternative modalities. We will proceed with the following:   Interventional treatment options: - None indicated at this time.   Rehabilitative options: - Continue with an active HEP.   I gave the patient a list of home exercises to do for pain reduction and overall improvement in functional status including but not limited to active neck rotation, active neck side bend, neck flexion, neck extension, chin tuck, scalene stretch, isometric neck flexion, isometric neck extension, isometric neck sidebend, headlift/neck curl, headlift/neck sidebend, neck extension on hands and knees, and scapula squeeze.   Medications: - Continue with Meloxicam as needed.  I advised the patient that the NSAID should be taken with food.  In addition while taking the prescribed NSAID, no over the counter or other NSAIDs should be used, such as ibuprofen (Motrin or Advil) or naproxen (Aleve) as this can cause stomach upset or other side effects.  If needed for fever or breakthrough pain Tylenol can be used.   - Follow up as needed.   I Nallely Esposito attest that this documentation has been prepared under the direction and in the presence of provider Dr. Jeff Wooten.  The documentation recorded by the scribe in my presence, accurately reflects the service I personally performed, and the decisions made by me with my edits as appropriate.   Jeff Wooten, DO

## 2024-04-30 ENCOUNTER — APPOINTMENT (OUTPATIENT)
Dept: CARDIOLOGY | Facility: CLINIC | Age: 74
End: 2024-04-30
Payer: MEDICARE

## 2024-04-30 VITALS
SYSTOLIC BLOOD PRESSURE: 147 MMHG | HEART RATE: 71 BPM | BODY MASS INDEX: 24.48 KG/M2 | OXYGEN SATURATION: 96 % | WEIGHT: 133 LBS | DIASTOLIC BLOOD PRESSURE: 84 MMHG | HEIGHT: 62 IN

## 2024-04-30 DIAGNOSIS — Z00.00 ENCOUNTER FOR GENERAL ADULT MEDICAL EXAMINATION W/OUT ABNORMAL FINDINGS: ICD-10-CM

## 2024-04-30 DIAGNOSIS — R00.2 PALPITATIONS: ICD-10-CM

## 2024-04-30 PROCEDURE — 99213 OFFICE O/P EST LOW 20 MIN: CPT

## 2024-04-30 NOTE — REVIEW OF SYSTEMS
[Fever] : no fever [Chills] : no chills [Blurry Vision] : no blurred vision [Hearing Loss] : hearing loss [SOB] : no shortness of breath [Dyspnea on exertion] : dyspnea during exertion [Cough] : no cough [Abdominal Pain] : no abdominal pain [Dysuria] : no dysuria [Joint Pain] : no joint pain [Dizziness] : no dizziness [Confusion] : no confusion was observed [Easy Bleeding] : no tendency for easy bleeding [de-identified] : edmund

## 2024-04-30 NOTE — DISCUSSION/SUMMARY
[FreeTextEntry1] : 75 yo with this month several episodes palpitations up to 10 minutes in past.Now rare. No associated chest pain. . No further ARAMBULA.  She drinks decaf coffee. She does stretching. She snores. She dreams.   Palpitations now  resolved MCOT 4/22 neg. SE 4/22 EKG equivocal echo neg.  Now treadmill 30 mins 5  per week.No chest pain. No Arambula.. Will check blood. EKG reviewed.

## 2024-04-30 NOTE — HISTORY OF PRESENT ILLNESS
[FreeTextEntry1] : 73 yo Palpitations slight yesterday.   MCOT 4/22 neg SE 4/22 EKG equivocal echo neg. . Now treadmill 30 mins 5 per week.No chest pain. No Arambula.She gained  3 llbs.

## 2024-11-04 DIAGNOSIS — R00.2 PALPITATIONS: ICD-10-CM

## 2025-04-16 LAB
ALBUMIN SERPL ELPH-MCNC: 4.8 G/DL
ALP BLD-CCNC: 59 U/L
ALT SERPL-CCNC: 9 U/L
ANION GAP SERPL CALC-SCNC: 11 MMOL/L
AST SERPL-CCNC: 24 U/L
BASOPHILS # BLD AUTO: 0.02 K/UL
BASOPHILS NFR BLD AUTO: 0.5 %
BILIRUB SERPL-MCNC: 0.4 MG/DL
BUN SERPL-MCNC: 13 MG/DL
CALCIUM SERPL-MCNC: 10.3 MG/DL
CHLORIDE SERPL-SCNC: 103 MMOL/L
CHOLEST SERPL-MCNC: 234 MG/DL
CO2 SERPL-SCNC: 31 MMOL/L
CREAT SERPL-MCNC: 0.8 MG/DL
EGFRCR SERPLBLD CKD-EPI 2021: 77 ML/MIN/1.73M2
EOSINOPHIL # BLD AUTO: 0.03 K/UL
EOSINOPHIL NFR BLD AUTO: 0.7 %
ESTIMATED AVERAGE GLUCOSE: 123 MG/DL
GLUCOSE SERPL-MCNC: 128 MG/DL
HBA1C MFR BLD HPLC: 5.9 %
HCT VFR BLD CALC: 42.9 %
HDLC SERPL-MCNC: 88 MG/DL
HGB BLD-MCNC: 13.9 G/DL
IMM GRANULOCYTES NFR BLD AUTO: 0 %
LDLC SERPL-MCNC: 126 MG/DL
LYMPHOCYTES # BLD AUTO: 1.62 K/UL
LYMPHOCYTES NFR BLD AUTO: 39.9 %
MAN DIFF?: NORMAL
MCHC RBC-ENTMCNC: 29.8 PG
MCHC RBC-ENTMCNC: 32.4 G/DL
MCV RBC AUTO: 91.9 FL
MONOCYTES # BLD AUTO: 0.36 K/UL
MONOCYTES NFR BLD AUTO: 8.9 %
NEUTROPHILS # BLD AUTO: 2.03 K/UL
NEUTROPHILS NFR BLD AUTO: 50 %
NONHDLC SERPL-MCNC: 146 MG/DL
PLATELET # BLD AUTO: 203 K/UL
PMV BLD AUTO: 0 /100 WBCS
PMV BLD: 10.9 FL
POTASSIUM SERPL-SCNC: 4.5 MMOL/L
PROT SERPL-MCNC: 7.6 G/DL
RBC # BLD: 4.67 M/UL
RBC # FLD: 12.8 %
SODIUM SERPL-SCNC: 145 MMOL/L
T4 SERPL-MCNC: 9.6 UG/DL
TRIGL SERPL-MCNC: 119 MG/DL
TSH SERPL-ACNC: 1.56 UIU/ML
WBC # FLD AUTO: 4.06 K/UL

## 2025-05-21 ENCOUNTER — NON-APPOINTMENT (OUTPATIENT)
Age: 75
End: 2025-05-21

## 2025-05-23 ENCOUNTER — APPOINTMENT (OUTPATIENT)
Dept: CARDIOLOGY | Facility: CLINIC | Age: 75
End: 2025-05-23
Payer: MEDICARE

## 2025-05-23 VITALS
BODY MASS INDEX: 23 KG/M2 | OXYGEN SATURATION: 97 % | HEART RATE: 61 BPM | SYSTOLIC BLOOD PRESSURE: 144 MMHG | WEIGHT: 125 LBS | DIASTOLIC BLOOD PRESSURE: 77 MMHG | HEIGHT: 62 IN

## 2025-05-23 DIAGNOSIS — R00.2 PALPITATIONS: ICD-10-CM

## 2025-05-23 PROCEDURE — 99213 OFFICE O/P EST LOW 20 MIN: CPT

## 2025-05-23 PROCEDURE — 93000 ELECTROCARDIOGRAM COMPLETE: CPT

## 2025-05-23 PROCEDURE — G2211 COMPLEX E/M VISIT ADD ON: CPT
